# Patient Record
Sex: FEMALE | Race: BLACK OR AFRICAN AMERICAN | Employment: FULL TIME
[De-identification: names, ages, dates, MRNs, and addresses within clinical notes are randomized per-mention and may not be internally consistent; named-entity substitution may affect disease eponyms.]

---

## 2024-06-06 ENCOUNTER — HOSPITAL ENCOUNTER (OUTPATIENT)
Facility: HOSPITAL | Age: 44
Setting detail: RECURRING SERIES
Discharge: HOME | End: 2024-06-09
Payer: COMMERCIAL

## 2024-06-06 PROCEDURE — 97110 THERAPEUTIC EXERCISES: CPT

## 2024-06-06 PROCEDURE — 97161 PT EVAL LOW COMPLEX 20 MIN: CPT
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
## 2024-06-06 NOTE — PROGRESS NOTES
PHYSICAL / OCCUPATIONAL THERAPY - DAILY TREATMENT NOTE (updated )  For Eval visit    Patient Name: Ryan Concepcion    Date: 2024    : 1980  Insurance: Payor: HENRY / Plan: [facility] [place] / Product Type: *No Product type* /      Patient  verified yes     Visit #   Current / Total 1 12   Time   In / Out 950 1025   Pain   In / Out 9/10 9/10   Subjective Functional Status/Changes: See POC     TREATMENT AREA =  see POC    OBJECTIVE           25 min   Eval - untimed                      Therapeutic Procedures:    Tx Min Billable or 1:1 Min (if diff from Tx Min) Procedure, Rationale, Specifics   10  48436 Therapeutic Exercise (timed):  increase ROM, strength, coordination, balance, and proprioception to improve patient's ability to progress to PLOF and address remaining functional goals. (see flow sheet as applicable)     Details if applicable:              Details if applicable:            Details if applicable:            Details if applicable:     10  [facility] Totals Reminder: bill using total billable min of TIMED therapeutic procedures (example: do not include dry needle or estim unattended, both untimed codes, in totals to left)  8-22 min = 1 unit; 23-37 min = 2 units; 38-52 min = 3 units; 53-67 min = 4 units; 68-82 min = 5 units   Total Total     [x]  Patient Education billed concurrently with other procedures   [x] Review HEP    [] Progressed/Changed HEP, detail:    [] Other detail:       Objective Information/Functional Measures/Assessment    See POC    Patient will continue to benefit from skilled PT / OT services to modify and progress therapeutic interventions, analyze and address functional mobility deficits, analyze and address ROM deficits, analyze and address strength deficits, analyze and address soft tissue restrictions, analyze and cue for proper movement patterns, and analyze and modify for postural abnormalities to address functional deficits and attain remaining goals.    Progress

## 2024-06-10 ENCOUNTER — HOSPITAL ENCOUNTER (OUTPATIENT)
Facility: HOSPITAL | Age: 44
Setting detail: RECURRING SERIES
End: 2024-06-10
Payer: COMMERCIAL

## 2024-06-21 ENCOUNTER — HOSPITAL ENCOUNTER (OUTPATIENT)
Facility: HOSPITAL | Age: 44
Setting detail: RECURRING SERIES
Discharge: HOME | End: 2024-06-24
Payer: COMMERCIAL

## 2024-06-21 PROCEDURE — 97140 MANUAL THERAPY 1/> REGIONS: CPT

## 2024-06-21 PROCEDURE — 97110 THERAPEUTIC EXERCISES: CPT

## 2024-06-21 PROCEDURE — 97535 SELF CARE MNGMENT TRAINING: CPT
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
## 2024-06-27 ENCOUNTER — HOSPITAL ENCOUNTER (OUTPATIENT)
Facility: HOSPITAL | Age: 44
Setting detail: RECURRING SERIES
Discharge: HOME | End: 2024-06-30
Payer: COMMERCIAL

## 2024-06-27 PROCEDURE — 97140 MANUAL THERAPY 1/> REGIONS: CPT | Performed by: GENERAL ACUTE CARE HOSPITAL

## 2024-06-27 PROCEDURE — 97110 THERAPEUTIC EXERCISES: CPT | Performed by: GENERAL ACUTE CARE HOSPITAL

## 2024-06-27 PROCEDURE — 97112 NEUROMUSCULAR REEDUCATION: CPT | Performed by: GENERAL ACUTE CARE HOSPITAL

## 2024-06-27 PROCEDURE — 97530 THERAPEUTIC ACTIVITIES: CPT | Performed by: GENERAL ACUTE CARE HOSPITAL

## 2024-06-27 NOTE — PROGRESS NOTES
PHYSICAL / OCCUPATIONAL THERAPY - DAILY TREATMENT NOTE (updated )    Patient Name: Ryan Concepcion    Date: 2024    : 1980  Insurance: Payor: HENRY / [place]: [facility] [place] / Product Type: *No Product type* /      Patient  verified yes     Visit #   Current / Total 3 12   Time   In / Out 241 348    Total Treatment Time 67    Pain   In / Out 5/10 2/10    Subjective Functional Status/Changes: Patient states she felt relief after last session but it returned.    Changes to:  Meds, Allergies, Med Hx, Sx Hx?  If yes, update Summary List no       TREATMENT AREA =  Cervicalgia [M54.2]  PROGRESS REPORT DUE 2024  OBJECTIVE    Modalities Rationale:     decrease edema, decrease inflammation, decrease pain, and increase tissue extensibility to improve patient's ability to progress to PLOF and address remaining functional goals.     min [] Estim Unattended, type/location:                                      []  w/ice    []  w/heat    min [] Estim Attended, type/location:                                     []  w/US     []  w/ice    []  w/heat    []  TENS insruct      min []  Mechanical Traction: type/lbs                   []  pro   []  sup   []  int   []  cont    []  before manual    []  after manual   x min [x]  Ultrasound, settings/location:  To (B) cervical paraspinals 1.5 w/cm continue    min []  Iontophoresis w/ dexamethasone, location:                                               []  take home patch       []  in clinic   10  min  unbilled []  Ice     [x]  Heat    location/position:  Cervical spine in reclined position     min []  Paraffin,  details:     min []  Vasopneumatic Device, press/temp:     min []  Whirlpool / Fluido:    If using vaso (only need to measure limb vaso being performed on)      pre-treatment girth :       post-treatment girth :       measured at (landmark location) :      min []  Other:    Skin assessment post-treatment (if applicable):    []  intact    []  redness- no adverse

## 2024-07-03 ENCOUNTER — APPOINTMENT (OUTPATIENT)
Facility: HOSPITAL | Age: 44
End: 2024-07-03
Payer: COMMERCIAL

## 2024-07-10 ENCOUNTER — HOSPITAL ENCOUNTER (OUTPATIENT)
Facility: HOSPITAL | Age: 44
Setting detail: RECURRING SERIES
Discharge: HOME | End: 2024-07-13
Payer: COMMERCIAL

## 2024-07-10 PROCEDURE — 97110 THERAPEUTIC EXERCISES: CPT | Performed by: GENERAL ACUTE CARE HOSPITAL

## 2024-07-10 PROCEDURE — 97112 NEUROMUSCULAR REEDUCATION: CPT | Performed by: GENERAL ACUTE CARE HOSPITAL

## 2024-07-10 PROCEDURE — 97140 MANUAL THERAPY 1/> REGIONS: CPT | Performed by: GENERAL ACUTE CARE HOSPITAL

## 2024-07-10 NOTE — PROGRESS NOTES
PHYSICAL / OCCUPATIONAL THERAPY - DAILY TREATMENT NOTE (updated )    Patient Name: Ryan Concepcion    Date: 7/10/2024    : 1980  Insurance: Payor: HENRY / [place]: [facility] [place] / Product Type: *No Product type* /      Patient  verified yes     Visit #   Current / Total 4 12   Time   In / Out 329 425   Total Treatment Time 56    Pain   In / Out 3/10 2/10    Subjective Functional Status/Changes: Patient reports her neck has been feeling better, but low back has been bothering her. She saw PCP for her low back who referred her to orthopedic who she sees tomorrow.     Patient denies radicular sx, leg weakness. Back pain located mostly right lumbar.     % improvement: 70% neck, 50% back   Max pain 4/10 neck, 6/10 back   Avg pain 3/10 neck, 5/10 back   Min pain 2/10 pressure at neck, 3/10 back      Current improvements: improved neck pain, improved sleeping tolerance with rolled towel under neck, improvement in symptoms short term with HEP,   Remaining functional limitations: increased low back pain with prolonged sitting, standing for too long, neck pressure at end of day       NDI: 34%    RICHARD:  52%   Changes to:  Meds, Allergies, Med Hx, Sx Hx?  If yes, update Summary List no       TREATMENT AREA =  Cervicalgia [M54.2]  PROGRESS REPORT DUE 2024      OBJECTIVE    Modalities Rationale:     decrease edema, decrease inflammation, decrease pain, and increase tissue extensibility to improve patient's ability to progress to PLOF and address remaining functional goals.     min [] Estim Unattended, type/location:                                      []  w/ice    []  w/heat    min [] Estim Attended, type/location:                                     []  w/US     []  w/ice    []  w/heat    []  TENS insruct      min []  Mechanical Traction: type/lbs                   []  pro   []  sup   []  int   []  cont    []  before manual    []  after manual   x min [x]  Ultrasound, settings/location:  To (B) cervical
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
## 2024-07-15 ENCOUNTER — APPOINTMENT (OUTPATIENT)
Facility: HOSPITAL | Age: 44
End: 2024-07-15
Payer: COMMERCIAL

## 2024-07-17 ENCOUNTER — APPOINTMENT (OUTPATIENT)
Facility: HOSPITAL | Age: 44
End: 2024-07-17
Payer: COMMERCIAL

## 2024-07-22 ENCOUNTER — HOSPITAL ENCOUNTER (OUTPATIENT)
Facility: HOSPITAL | Age: 44
Setting detail: RECURRING SERIES
End: 2024-07-22
Payer: COMMERCIAL

## 2024-07-26 ENCOUNTER — APPOINTMENT (OUTPATIENT)
Facility: HOSPITAL | Age: 44
End: 2024-07-26
Payer: COMMERCIAL

## 2024-07-31 ENCOUNTER — HOSPITAL ENCOUNTER (OUTPATIENT)
Facility: HOSPITAL | Age: 44
Setting detail: RECURRING SERIES
Discharge: HOME | End: 2024-08-03
Payer: COMMERCIAL

## 2024-07-31 PROCEDURE — 97140 MANUAL THERAPY 1/> REGIONS: CPT

## 2024-07-31 PROCEDURE — 97110 THERAPEUTIC EXERCISES: CPT

## 2024-07-31 PROCEDURE — 97112 NEUROMUSCULAR REEDUCATION: CPT

## 2024-07-31 NOTE — PROGRESS NOTES
PHYSICAL / OCCUPATIONAL THERAPY - DAILY TREATMENT NOTE (updated )    Patient Name: Ryan Concepcion    Date: 2024    : 1980  Insurance: Payor: HENRY / [place]: [facility] [place] / Product Type: *No Product type* /      Patient  verified yes     Visit #   Current / Total 5 12   Time   In / Out 2:44 3:32   Total Treatment Time 48   Pain   In / Out 2/10 1/10   Subjective Functional Status/Changes: The neck has actually been a lot better.      NEXT PROGRESS NOTE DUE: 2024    TREATMENT AREA =  Cervicalgia [M54.2]    OBJECTIVE    Modalities Rationale:     decrease pain and increase tissue extensibility to improve patient's ability to progress to PLOF and address remaining functional goals.     min [] Estim Unattended, type/location:                                      []  w/ice    []  w/heat    min [] Estim Attended, type/location:                                     []  w/US     []  w/ice    []  w/heat    []  TENS insruct      min []  Mechanical Traction: type/lbs                   []  pro   []  sup   []  int   []  cont    []  before manual    []  after manual    min []  Ultrasound, settings/location:      min []  Iontophoresis w/ dexamethasone, location:                                               []  take home patch       []  in clinic   10 min  unbilled []  Ice     [x]  Heat    location/position: Prone to neck     min []  Paraffin,  details:     min []  Vasopneumatic Device, press/temp:     min []  Whirlpool / Fluido:    If using vaso (only need to measure limb vaso being performed on)      pre-treatment girth :       post-treatment girth :       measured at (landmark location) :      min []  Other:    Skin assessment post-treatment (if applicable):    []  intact    []  redness- no adverse reaction                 []redness - adverse reaction:      Vasopnuematic compression justification:  Per bilateral girth measures taken and listed above the edema is considered significant and having an

## 2025-05-06 ENCOUNTER — HOSPITAL ENCOUNTER (OUTPATIENT)
Facility: HOSPITAL | Age: 45
Setting detail: RECURRING SERIES
Discharge: HOME | End: 2025-05-09
Payer: COMMERCIAL

## 2025-05-06 PROCEDURE — 97161 PT EVAL LOW COMPLEX 20 MIN: CPT

## 2025-05-06 NOTE — PROGRESS NOTES
PHYSICAL / OCCUPATIONAL THERAPY - DAILY TREATMENT NOTE (updated )  For Eval visit    Patient Name: Ryan Concepcion    Date: 2025    : 1980  Insurance: Payor: NINA FIGUEROA / Plan: [facility] [place] / Product Type: *No Product type* /      Patient  verified yes     Visit #   Current / Total 1 8-12   Time   In / Out 4:00 4:50   Total Treatment  Time  50   Pain   In / Out 7/10 7/10   Subjective Functional Status/Changes: See below     NEXT PROGRESS NOTE DUE: 2025    TREATMENT AREA =  Right hip pain  Other low back pain    SUBJECTIVE: Pt presents with c/o LBP and R hip pain that onset ~2 years ago. 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 The tear wasn't significant enough to attempt surgical intervention, but she has had some injections (2 separate rounds; 2 years apart) with some relief the first time but no improvements the second time. She had PRP injections, which did not seem to provide any significant relief at the time. She reports both feet have been experiencing tingling along the plantar surface of B feet, which has reduced in frequency over the last few months. No specific position associated with it, and she notes it has not been present much lately. LBP is bilateral, but no issues with the L hip. Work activities bother her the most, which she finds the prolonged positioning to be the most consistent exacerbating factor.    Co-morbidities: None reported     Allergies: NKA/NKDA    OBJECTIVE    Modalities Rationale:     decrease inflammation, decrease pain, and increase tissue extensibility to improve patient's ability to progress to PLOF and address remaining functional goals.     min [] Estim Unattended, type/location:                                      []  w/ice    []  w/heat    min [] Estim Attended, type/location:                                     []  w/US     []  w/ice    []  w/heat    []  TENS insruct      min []  Mechanical Traction: type/lbs                   
bilat   Extension: 5/5 bilat      Lumbar Myotomes: WNL at all levels, bilaterally     Special Tests:  MADELEINE: (+) bilat for mobility deficits   SLR: (-)  bilat   90/90 SLR: (-) bilat      Bridge 5x: No pain; no increase with repetition      Oswestry: 44%     Evaluation Complexity:  History:  LOW Complexity : Zero comorbidities / personal factors that will impact the outcome / POC; Examination:  LOW Complexity : 1-2 Standardized tests and measures addressing body structure, function, activity limitation and / or participation in recreation  ;Presentation:  LOW Complexity : Stable, uncomplicated  ;Clinical Decision Making:  Oswestry Disability Index (RICHARD) for Low Back Pain = 44 % ; (> 41% Severe Disability) = HIGH Complexity   Overall Complexity Rating: LOW   Problem List: pain affecting function, decrease ROM, decrease strength, decrease ADL/functional abilities, decrease activity tolerance, and decrease flexibility/joint mobility   Treatment Plan may include any combination of the followin Therapeutic Exercise, 06271 Neuromuscular Re-Education, 46666 Manual Therapy, 82784 Therapeutic Activity, 56412 Self Care/Home Management, 78778 Electrical Stim unattended /  (MCR), and (Elective Self Pay) Needle Insertion w/o Injection (1 or 2 muscles), (3+ muscles)  Patient / Family readiness to learn indicated by: asking questions, trying to perform skills, interest, return verbalization , and return demonstration   Persons(s) to be included in education: patient (P)  Barriers to Learning/Limitations: none  Measures taken if barriers to learning present: N/A  Patient Goal (s): \"Pain management\"  Patient Self Reported Health Status: good  Rehabilitation Potential: good    Short Term Goals: To be accomplished in 4 weeks   Pt will be IND and consistent with her initial HEP to promote improved symptoms and a good tolerance to progressions throughout her POC.   Status at IE Provided and reviewed today  Long Term Goals:

## 2025-05-15 ENCOUNTER — APPOINTMENT (OUTPATIENT)
Facility: HOSPITAL | Age: 45
End: 2025-05-15
Payer: COMMERCIAL

## 2025-06-06 ENCOUNTER — HOSPITAL ENCOUNTER (OUTPATIENT)
Facility: HOSPITAL | Age: 45
Setting detail: RECURRING SERIES
End: 2025-06-06
Payer: COMMERCIAL

## 2025-06-09 ENCOUNTER — HOSPITAL ENCOUNTER (OUTPATIENT)
Facility: HOSPITAL | Age: 45
Setting detail: RECURRING SERIES
Discharge: HOME | End: 2025-06-12
Payer: COMMERCIAL

## 2025-06-09 PROCEDURE — 97530 THERAPEUTIC ACTIVITIES: CPT

## 2025-06-09 PROCEDURE — 97140 MANUAL THERAPY 1/> REGIONS: CPT

## 2025-06-09 NOTE — PROGRESS NOTES
PHYSICAL / OCCUPATIONAL THERAPY - DAILY TREATMENT NOTE (updated )    Patient Name: Ryan Concepcion    Date: 2025    : 1980  Insurance: Payor: NINA FIGUEROA / [place]: [facility] [place] / Product Type: *No Product type* /      Patient  verified yes     Visit #   Current / Total 2 8-12   Time   In / Out 11:41 12:40    Total Treatment Time 59   Pain   In / Out 6/10 5/10   Subjective Functional Status/Changes: Every day is different.      NEXT PROGRESS NOTE DUE: 2025    TREATMENT AREA =  Right hip pain  Other low back pain    OBJECTIVE    Modalities Rationale:     decrease inflammation and decrease pain to improve patient's ability to progress to PLOF and address remaining functional goals.     min [] Estim Unattended, type/location:                                      []  w/ice    []  w/heat    min [] Estim Attended, type/location:                                     []  w/US     []  w/ice    []  w/heat    []  TENS insruct      min []  Mechanical Traction: type/lbs                   []  pro   []  sup   []  int   []  cont    []  before manual    []  after manual    min []  Ultrasound, settings/location:      min []  Iontophoresis w/ dexamethasone, location:                                               []  take home patch       []  in clinic   10 min  unbilled [x]  Ice     []  Heat    location/position: Prone to low back     min []  Paraffin,  details:     min []  Vasopneumatic Device, press/temp:     min []  Whirlpool / Fluido:    If using vaso (only need to measure limb vaso being performed on)      pre-treatment girth :       post-treatment girth :       measured at (landmark location) :      min []  Other:    Skin assessment post-treatment (if applicable):    [x]  intact    []  redness- no adverse reaction                 []redness - adverse reaction:      Vasopnuematic compression justification:  Per bilateral girth measures taken and listed above the edema is considered significant and having an 
Product Type: *No Product type* /     Primary mitigating factor which impeded progress:  None of these apply from preset list (refer to note for other details)      Frequency / Duration:   Patient to be seen   1 to 2   times per week for   10    treatments:    RECOMMENDATIONS  We would like to continue therapy for progress to goals stated above.  Continue per initial Plan of Care.    If you have any questions/comments please contact us directly.  Thank you for allowing us to assist in the care of your patient.    Becca Wadsworth, PT       6/9/2025       12:09 PM  REPORTING PERIOD: 05/06/2025 to 06/09/2025

## 2025-06-13 ENCOUNTER — APPOINTMENT (OUTPATIENT)
Facility: HOSPITAL | Age: 45
End: 2025-06-13
Payer: COMMERCIAL

## 2025-06-19 ENCOUNTER — HOSPITAL ENCOUNTER (OUTPATIENT)
Facility: HOSPITAL | Age: 45
Setting detail: RECURRING SERIES
Discharge: HOME | End: 2025-06-22
Payer: COMMERCIAL

## 2025-06-19 PROCEDURE — 97530 THERAPEUTIC ACTIVITIES: CPT

## 2025-06-19 PROCEDURE — 97110 THERAPEUTIC EXERCISES: CPT

## 2025-06-19 PROCEDURE — 97140 MANUAL THERAPY 1/> REGIONS: CPT

## 2025-06-19 NOTE — PROGRESS NOTES
PHYSICAL / OCCUPATIONAL THERAPY - DAILY TREATMENT NOTE (updated )    Patient Name: Ryan Concepcion    Date: 2025    : 1980  Insurance: Payor: NINA FIGUEROA / [place]: [facility] [place] / Product Type: *No Product type* /      Patient  verified yes     Visit #   Current / Total 1 10   Time   In / Out 2:02 2:50   Total Treatment Time 48   Pain   In / Out 7/10 5/10   Subjective Functional Status/Changes: It's kind of how it's been.      NEXT PROGRESS NOTE DUE: 2025    TREATMENT AREA =  Right hip pain  Other low back pain    OBJECTIVE    Modalities Rationale:     decrease inflammation and decrease pain to improve patient's ability to progress to PLOF and address remaining functional goals.     min [] Estim Unattended, type/location:                                      []  w/ice    []  w/heat    min [] Estim Attended, type/location:                                     []  w/US     []  w/ice    []  w/heat    []  TENS insruct      min []  Mechanical Traction: type/lbs                   []  pro   []  sup   []  int   []  cont    []  before manual    []  after manual    min []  Ultrasound, settings/location:      min []  Iontophoresis w/ dexamethasone, location:                                               []  take home patch       []  in clinic   PD min  unbilled []  Ice     []  Heat    location/position:     min []  Paraffin,  details:     min []  Vasopneumatic Device, press/temp:     min []  Whirlpool / Fluido:    If using vaso (only need to measure limb vaso being performed on)      pre-treatment girth :       post-treatment girth :       measured at (landmark location) :      min []  Other:    Skin assessment post-treatment (if applicable):    []  intact    []  redness- no adverse reaction                 []redness - adverse reaction:      Vasopnuematic compression justification:  Per bilateral girth measures taken and listed above the edema is considered significant and having an impact on the

## 2025-06-23 ENCOUNTER — HOSPITAL ENCOUNTER (OUTPATIENT)
Facility: HOSPITAL | Age: 45
Setting detail: RECURRING SERIES
Discharge: HOME | End: 2025-06-26
Payer: COMMERCIAL

## 2025-06-23 PROCEDURE — 97530 THERAPEUTIC ACTIVITIES: CPT

## 2025-06-23 PROCEDURE — 97140 MANUAL THERAPY 1/> REGIONS: CPT

## 2025-06-23 PROCEDURE — 97110 THERAPEUTIC EXERCISES: CPT

## 2025-06-23 NOTE — PROGRESS NOTES
PHYSICAL / OCCUPATIONAL THERAPY - DAILY TREATMENT NOTE (updated )    Patient Name: Ryan Concepcion    Date: 2025    : 1980  Insurance: Payor: NINA FIGUEROA / Plan: [facility] / Product Type: *No Product type* /      Patient  verified yes     Visit #   Current / Total 2 10   Time   In / Out 10:22 11:04    Total Treatment Time 42   Pain   In / Out 2/10 1/10   Subjective Functional Status/Changes: Today is actually not bad.      NEXT PROGRESS NOTE DUE: 2025    TREATMENT AREA =  Right hip pain  Other low back pain    OBJECTIVE    Modalities Rationale:     decrease inflammation, decrease pain, and increase tissue extensibility to improve patient's ability to progress to PLOF and address remaining functional goals.     min [] Estim Unattended, type/location:                                      []  w/ice    []  w/heat    min [] Estim Attended, type/location:                                     []  w/US     []  w/ice    []  w/heat    []  TENS insruct      min []  Mechanical Traction: type/lbs                   []  pro   []  sup   []  int   []  cont    []  before manual    []  after manual    min []  Ultrasound, settings/location:      min []  Iontophoresis w/ dexamethasone, location:                                               []  take home patch       []  in clinic   PD min  unbilled []  Ice     []  Heat    location/position:     min []  Paraffin,  details:     min []  Vasopneumatic Device, press/temp:     min []  Whirlpool / Fluido:    If using vaso (only need to measure limb vaso being performed on)      pre-treatment girth :       post-treatment girth :       measured at (landmark location) :      min []  Other:    Skin assessment post-treatment (if applicable):    []  intact    []  redness- no adverse reaction                 []redness - adverse reaction:      Vasopnuematic compression justification:  Per bilateral girth measures taken and listed above the edema is considered significant

## 2025-06-30 ENCOUNTER — HOSPITAL ENCOUNTER (OUTPATIENT)
Facility: HOSPITAL | Age: 45
Setting detail: RECURRING SERIES
End: 2025-06-30
Payer: COMMERCIAL

## 2025-07-07 ENCOUNTER — HOSPITAL ENCOUNTER (OUTPATIENT)
Facility: HOSPITAL | Age: 45
Setting detail: RECURRING SERIES
Discharge: HOME | End: 2025-07-10
Payer: COMMERCIAL

## 2025-07-07 PROCEDURE — 97012 MECHANICAL TRACTION THERAPY: CPT

## 2025-07-07 PROCEDURE — 97110 THERAPEUTIC EXERCISES: CPT

## 2025-07-07 PROCEDURE — 97140 MANUAL THERAPY 1/> REGIONS: CPT

## 2025-07-07 PROCEDURE — 97530 THERAPEUTIC ACTIVITIES: CPT

## 2025-07-07 NOTE — PROGRESS NOTES
PHYSICAL / OCCUPATIONAL THERAPY - DAILY TREATMENT NOTE (updated )    Patient Name: Ryan Concepcion    Date: 2025    : 1980  Insurance: Payor: NINA FIGUEROA / Plan: [facility] / Product Type: *No Product type* /      Patient  verified yes     Visit #   Current / Total 3 10   Time   In / Out 11:40 12:35    Total Treatment Time 55   Pain   In / Out 2/10 2/10   Subjective Functional Status/Changes: When I stretch it always loosens up but then it goes back to the same thing.      NEXT PROGRESS NOTE DUE: 2025    TREATMENT AREA =  Right hip pain  Other low back pain    OBJECTIVE    Modalities Rationale:     decrease pain and increase tissue extensibility to improve patient's ability to progress to PLOF and address remaining functional goals.     min [] Estim Unattended, type/location:                                      []  w/ice    []  w/heat    min [] Estim Attended, type/location:                                     []  w/US     []  w/ice    []  w/heat    []  TENS insruct     10 min [x]  Mechanical Traction: type/lbs 90/60, 2 step, 50%, static                   []  pro   [x]  sup   []  int   [x]  cont    []  before manual    [x]  after manual    min []  Ultrasound, settings/location:      min []  Iontophoresis w/ dexamethasone, location:                                               []  take home patch       []  in clinic    min  unbilled []  Ice     []  Heat    location/position:     min []  Paraffin,  details:     min []  Vasopneumatic Device, press/temp:     min []  Whirlpool / Fluido:    If using vaso (only need to measure limb vaso being performed on)      pre-treatment girth :       post-treatment girth :       measured at (landmark location) :      min []  Other:    Skin assessment post-treatment (if applicable):    [x]  intact    []  redness- no adverse reaction                 []redness - adverse reaction:      Vasopnuematic compression justification:  Per bilateral girth measures 
overall change in status. Initiated Ronal exercises today, as well as lumbar traction to treat pain. She will continue to benefit from skilled physical therapy, with appropriate program updates, to improve status.     PROGRESS TOWARDS GOALS:  1. Pt will report pain levels no greater than 3/10 for an improved tolerance to work duties.   Status at IE 10/10 at worst  Status at PN: 10/10   Current status: progressing, 8/10 07/07/2025  2.  Pt will improve her LEFS score to 54/80 pts as an indication of an improved tolerance to ADL/IADLs.   Status at IE oswestry 44%  Status at PN: LEFS score 45/80 pts   Current status: not met, 33/80 pts 07/07/2025  3.  Pt will demonstrate 5/5 hip abduction strength bilaterally for improved lumbopelvic stability during ambulation and exercise.   Status at IE 4/5 bilat   Status at PN: Left 5/5, Right 4/5   Current status: progressing, 4+/5 bilaterally 07/07/2025  4.  Pt will report at least 75% improvements in symptoms and functional activity tolerance as an indication of progression towards her PLOF.   Status at IE Goal initiated.   Status at PN: limited change overall (but having a good day today)   Current: progressing, limited change overall but does not improvements temporarily 07/07/2025      LONG-TERM GOALS TO BE ACHIEVED IN 6 treatments:   1. Pt will report pain levels no greater than 3/10 for an improved tolerance to work duties.   Status at IE 10/10 at worst  Status at PN: 8/10   2.  Pt will improve her LEFS score to 54/80 pts as an indication of an improved tolerance to ADL/IADLs.   Status at IE oswestry 44%  Status at PN: 33/80 pts   3.  Pt will demonstrate 5/5 hip abduction strength bilaterally for improved lumbopelvic stability during ambulation and exercise.   Status at IE 4/5 bilat   Status at PN: 4+/5 bilaterally   4.  Pt will report at least 75% improvements in symptoms and functional activity tolerance as an indication of progression towards her PLOF.   Status at IE

## 2025-07-14 ENCOUNTER — HOSPITAL ENCOUNTER (OUTPATIENT)
Facility: HOSPITAL | Age: 45
Setting detail: RECURRING SERIES
Discharge: HOME | End: 2025-07-17
Payer: COMMERCIAL

## 2025-07-14 PROCEDURE — 97530 THERAPEUTIC ACTIVITIES: CPT

## 2025-07-14 PROCEDURE — 97140 MANUAL THERAPY 1/> REGIONS: CPT

## 2025-07-14 PROCEDURE — 97110 THERAPEUTIC EXERCISES: CPT

## 2025-07-14 NOTE — PROGRESS NOTES
STM/DTM to Right glutes/piriformis/QL           Details if applicable:     45 40 MC BC Totals Reminder: bill using total billable min of TIMED therapeutic procedures (example: do not include dry needle or estim unattended, both untimed codes, in totals to left)  8-22 min = 1 unit; 23-37 min = 2 units; 38-52 min = 3 units; 53-67 min = 4 units; 68-82 min = 5 units   Total Total     [x]  Patient Education billed concurrently with other procedures   [x] Review HEP    [] Progressed/Changed HEP, detail:    [] Other detail:       Objective Information/Functional Measures/Assessment:  -Patient challenged with glute engagement during prone hip extension, notes some back discomfort, especially when performing on Right. Initiates prone hip extension with hamstrings  -added butt burners, hip hikes for glute recruitment. Some back soreness with Right hip hikes  -added slider lunges with pitched posture for improved glute recruitment   -discussed improving glute engagement outside of PT- isometric glute contractions, double glute with releasing left before right. Suggested she add in foam rolling to back/QL and glutes prior to workouts   -held mechanical traction as symptoms appear more related to soft tissue today    Patient will continue to benefit from skilled PT / OT services to modify and progress therapeutic interventions, analyze and address functional mobility deficits, analyze and address ROM deficits, analyze and address strength deficits, analyze and address soft tissue restrictions, analyze and cue for proper movement patterns, analyze and modify for postural abnormalities, analyze and address imbalance/dizziness, and instruct in home and community integration to address functional deficits and attain remaining goals.    Progress toward goals / Updated goals:  []  See Progress Note/Recertification    LONG-TERM GOALS TO BE ACHIEVED IN 6 treatments:   1. Pt will report pain levels no greater than 3/10 for an improved

## 2025-07-28 ENCOUNTER — APPOINTMENT (OUTPATIENT)
Facility: HOSPITAL | Age: 45
End: 2025-07-28
Payer: COMMERCIAL

## 2025-08-04 ENCOUNTER — APPOINTMENT (OUTPATIENT)
Facility: HOSPITAL | Age: 45
End: 2025-08-04
Payer: COMMERCIAL

## 2025-08-18 ENCOUNTER — HOSPITAL ENCOUNTER (OUTPATIENT)
Facility: HOSPITAL | Age: 45
Setting detail: RECURRING SERIES
Discharge: HOME | End: 2025-08-21
Payer: COMMERCIAL

## 2025-08-18 PROCEDURE — 97110 THERAPEUTIC EXERCISES: CPT

## 2025-08-18 PROCEDURE — 97530 THERAPEUTIC ACTIVITIES: CPT

## 2025-08-18 PROCEDURE — 97140 MANUAL THERAPY 1/> REGIONS: CPT

## 2025-08-25 ENCOUNTER — HOSPITAL ENCOUNTER (OUTPATIENT)
Facility: HOSPITAL | Age: 45
Setting detail: RECURRING SERIES
Discharge: HOME | End: 2025-08-28
Payer: COMMERCIAL

## 2025-08-25 PROCEDURE — 97530 THERAPEUTIC ACTIVITIES: CPT

## 2025-08-25 PROCEDURE — 97140 MANUAL THERAPY 1/> REGIONS: CPT
